# Patient Record
(demographics unavailable — no encounter records)

---

## 2024-12-27 NOTE — HISTORY OF PRESENT ILLNESS
[Dull/Aching] : dull/aching [Frequent] : frequent [Rest] : rest [de-identified] : 12/27/24: KACIE meza [RHD] 64 year old male is here today for evaluation of LEFT shoulder pain x 1 month w/o injury. pain worse with lifting, getting dressed and when laying on left side. Also c/o left scapula pain. not taking anything for pain.  [de-identified] : lifting

## 2024-12-27 NOTE — PROCEDURE
[FreeTextEntry3] : Patient Identification Name/: Verbal with patient and/or family   Procedure Verification: Procedure confirmed with patient or family/designee Consent for procedure: Verbal Consent Given Relevant documentation completed, reviewed, and signed Clinical indications for procedure confirmed   Time-out with all members of procedure team immediately prior to procedure: Correct patient identified. Agreement on procedure. Correct side and site.   SHOULDER SUBACROMIAL INJECTION - LEFT After verbal consent and identification of the correct patient and correct site, the posterior LEFT shoulder were prepped using alcohol. This was allowed time to air dry. After ethyl chloride spray for skin anesthesia, a mixture of 1cc Celestone 6mg/ml, 3cc Lidocaine 1%, and 3cc Bupivacaine 0.5% was injected under ultrasound guidance into the subacromial space of both shoulders from posterior using a sterile 22G needle. Visualization of the needle and placement of each injection was performed without any complications. The patient tolerated the procedure well. After-care instructions were provided and included instructions to ice the area and to call if redness, pain, or fever develop.

## 2024-12-27 NOTE — DISCUSSION/SUMMARY
[de-identified] : Assessment: The patient is a 64 year old male with physical exam findings consistent with [].   Patient and I discussed their symptoms. Discussed findings of today's exam and possible causes of patient's pain. Educated patient on their most probable diagnosis. Reviewed possible courses of treatment, and we collaboratively decided best course of treatment at this time will include:   1. CSI of left shoulder done today - [facility] well 2. ~  ~ 3. ~  ~     Follow up in [ ]  Instructions: Dx / Natural History The patient was advised of the diagnosis.  The natural history of the pathology was explained in full to the patient in layman's terms.  Several different treatment options were discussed and explained in full to the patient including the risks and benefits of both surgical and non-surgical treatments.  All questions and concerns were answered.   RICE I explained to the patient that rest, ice, compression, and elevation would benefit them.  They may return to activity after follow-up or when they no longer have any pain.   NSAIDs - OTC Patient is to begin over the counter oral anti-inflammatory medications on an as needed basis, as long as there are no medical contraindications.  Patient is counseled on possible GI and blood pressure side effects.   Pain Guide Activities The patient was advised to let pain guide the gradual advancement of activities.   Icing The patient was advised to apply ice (wrapped in a towel or protective covering) to the area daily (20 minutes at a time, 2-4X/day).   All of the patient's questions were answered to His satisfaction. Diagnoses and potential treatments were reviewed. He agreed with the plan and would like to move forward with it.

## 2024-12-27 NOTE — IMAGING
[de-identified] : LEFT Shoulder  Inspection: Scapula Winging: Negative Deformity: None Erythema: None Ecchymosis: None Abrasions: None Effusion: Mild Crepitus: Moderate   Range of Motion: Active Forward Flexion:110 degrees Passive Forward Flexion:130 degrees Active IR : back pocket Active ER :30 degrees   Motor Exam: Forward Flexion: 4+ out of 5 Flexion Plane of Scapula: 5 out of 5 Abduction: 4+ out of 5 Internal Rotation: 5 out of 5 External Rotation: 4+ out of 5 Distal Motor Strength: 5 out of 5   Provocative Tests: Drop Arm: Negative [place]ila/Impingement: Positive [place]: Positive X-Arm Adduction: Negative Belly Press: Negative Bear Hug: Negative Lift Off: Negative Apprehension: Negative Relocation: Negative Posterior Load & Shift: Negative   Palpation: AC Joint: Nontender Clavicle: Nontender SC Joint: Nontender Bicepital Groove: Positive Coracoid Process: Positive Pectoralis Minor Tendon: Nontender Pectoralis Major Tendon: Nontender & palpably intact Proximal Humerus: Positive  Left X-Ray Examination of the SHOULDER (2 views): no fractures,subluxations or dislocations. Arthritis   Left X-Ray Examination of the SCAPULA 1 or 2 views shows: no significant abnormalities

## 2025-02-07 NOTE — DISCUSSION/SUMMARY
[de-identified] : Assessment:   The patient is a 64 year old male with physical exam findings consistent with LEFT SHOULDER IMPINGEMENT   - We discussed their diagnosis and treatment options at length including the risks and benefits of both surgical and non-surgical options. - We will continue conservative treatment with a course of PT, icing, and anti-inflammatory medication. - The patient was provided with a prescription to work on scapular strengthening and rotator cuff strengthening. - The patient was advised to let pain guide the gradual advancement of activities. - The risks, benefits, and alternatives to corticosteroid injection were reviewed with the patient and they wished to proceed with this treatment course.   Follow up as needed in 6 weeks to re-evaluate progress with therapy

## 2025-02-07 NOTE — IMAGING
[de-identified] : LEFT Shoulder  Inspection: Scapula Winging: Negative Deformity: None Erythema: None Ecchymosis: None Abrasions: None Effusion: Mild Crepitus: Moderate   Range of Motion: Active Forward Flexion:110 degrees Passive Forward Flexion:130 degrees Active IR : back pocket Active ER :30 degrees   Motor Exam: Forward Flexion: 4+ out of 5 Flexion Plane of Scapula: 5 out of 5 Abduction: 4+ out of 5 Internal Rotation: 5 out of 5 External Rotation: 4+ out of 5 Distal Motor Strength: 5 out of 5   Provocative Tests: Drop Arm: Negative Avila/Impingement: Positive [place]: Positive X-Arm Adduction: Negative Belly Press: Negative Bear Hug: Negative Lift Off: Negative Apprehension: Negative Relocation: Negative Posterior Load & Shift: Negative   Palpation: AC Joint: Nontender Clavicle: Nontender SC Joint: Nontender Bicepital Groove: Positive Coracoid Process: Positive Pectoralis Minor Tendon: Nontender Pectoralis Major Tendon: Nontender & palpably intact Proximal Humerus: Positive  Left X-Ray Examination of the SHOULDER (2 views): no fractures,subluxations or dislocations. Arthritis   Left X-Ray Examination of the SCAPULA 1 or 2 views shows: no significant abnormalities

## 2025-02-07 NOTE — HISTORY OF PRESENT ILLNESS
[de-identified] : 2/7/25: Here for fu. 50% relief from CSI. He states pain is manageable.  12/27/24: KACIE meza [RHD] 64 year old male is here today for evaluation of LEFT shoulder pain x 1 month w/o injury. pain worse with lifting, getting dressed and when laying on left side. Also c/o left scapula pain. not taking anything for pain.  [FreeTextEntry5] : KACIE is here today for LEFT shoulder follow up. pain decreased since last visit and CSI.